# Patient Record
Sex: MALE | Race: OTHER
[De-identification: names, ages, dates, MRNs, and addresses within clinical notes are randomized per-mention and may not be internally consistent; named-entity substitution may affect disease eponyms.]

---
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
## 2023-10-03 ENCOUNTER — HOSPITAL ENCOUNTER (OUTPATIENT)
Dept: HOSPITAL 53 - M LAB REF | Age: 23
End: 2023-10-03
Attending: PHYSICIAN ASSISTANT
Payer: COMMERCIAL

## 2023-10-03 DIAGNOSIS — Z20.2: ICD-10-CM

## 2023-10-03 DIAGNOSIS — R30.0: Primary | ICD-10-CM

## 2023-10-03 LAB — N GONORRHOEA RRNA SPEC QL NAA+PROBE: NEGATIVE
